# Patient Record
Sex: MALE | NOT HISPANIC OR LATINO | URBAN - METROPOLITAN AREA
[De-identification: names, ages, dates, MRNs, and addresses within clinical notes are randomized per-mention and may not be internally consistent; named-entity substitution may affect disease eponyms.]

---

## 2021-12-12 ENCOUNTER — EMERGENCY (EMERGENCY)
Facility: HOSPITAL | Age: 26
LOS: 1 days | Discharge: HOME | End: 2021-12-12
Admitting: EMERGENCY MEDICINE
Payer: COMMERCIAL

## 2021-12-12 VITALS
DIASTOLIC BLOOD PRESSURE: 76 MMHG | RESPIRATION RATE: 16 BRPM | SYSTOLIC BLOOD PRESSURE: 128 MMHG | OXYGEN SATURATION: 98 % | TEMPERATURE: 98 F | HEART RATE: 79 BPM

## 2021-12-12 VITALS
RESPIRATION RATE: 16 BRPM | SYSTOLIC BLOOD PRESSURE: 133 MMHG | HEIGHT: 72 IN | OXYGEN SATURATION: 98 % | HEART RATE: 83 BPM | TEMPERATURE: 98 F | WEIGHT: 149.91 LBS | DIASTOLIC BLOOD PRESSURE: 84 MMHG

## 2021-12-12 DIAGNOSIS — J32.9 CHRONIC SINUSITIS, UNSPECIFIED: ICD-10-CM

## 2021-12-12 DIAGNOSIS — R51.9 HEADACHE, UNSPECIFIED: ICD-10-CM

## 2021-12-12 DIAGNOSIS — R09.81 NASAL CONGESTION: ICD-10-CM

## 2021-12-12 PROCEDURE — 70450 CT HEAD/BRAIN W/O DYE: CPT | Mod: 26

## 2021-12-12 PROCEDURE — 70486 CT MAXILLOFACIAL W/O DYE: CPT | Mod: 26

## 2021-12-12 PROCEDURE — 99285 EMERGENCY DEPT VISIT HI MDM: CPT

## 2021-12-12 RX ORDER — METOCLOPRAMIDE HCL 10 MG
10 TABLET ORAL ONCE
Refills: 0 | Status: COMPLETED | OUTPATIENT
Start: 2021-12-12 | End: 2021-12-12

## 2021-12-12 RX ORDER — ACETAMINOPHEN 500 MG
650 TABLET ORAL ONCE
Refills: 0 | Status: COMPLETED | OUTPATIENT
Start: 2021-12-12 | End: 2021-12-12

## 2021-12-12 RX ADMIN — Medication 650 MILLIGRAM(S): at 13:57

## 2021-12-12 RX ADMIN — Medication 10 MILLIGRAM(S): at 13:57

## 2021-12-12 NOTE — ED PROVIDER NOTE - CLINICAL SUMMARY MEDICAL DECISION MAKING FREE TEXT BOX
25 y/o M recently sent from urgent care for CAT scan of the head. Patient says he has had some pressure along his sinuses. Will give medications and get CT head. 27 y/o M recently sent from urgent care for CAT scan of the head. Patient says he has had some pressure along his sinuses. Will get CT scans. CT scans look normal, pt will be discharged home.

## 2021-12-12 NOTE — ED PROVIDER NOTE - OBJECTIVE STATEMENT
27 y/o M recently seen at urgent care for a sinus infection and treated with amoxicillin and steroids. He was told to come to the ER for CAT scan of the head. Patient says he has had some pressure along his sinuses. He has tried Tylenol and Excedrin and it hasn't helped. No fever, chills.

## 2021-12-12 NOTE — ED PROVIDER NOTE - PATIENT PORTAL LINK FT
You can access the FollowMyHealth Patient Portal offered by [facility] by registering at the following website: [email or web address]. By joining [employer]’s FollowMyHealth portal, you will also be able to view your health information using other applications (apps) compatible with our system.

## 2021-12-12 NOTE — ED ADULT NURSE NOTE - OBJECTIVE STATEMENT
Pt came in complaining of congestion that does not seem to resolve. Pt stated that he saw PCP and was placed on abx and not helping. Pt is alert, oriented and follows command.

## 2023-10-13 NOTE — ED PROVIDER NOTE - IV ALTEPLASE EXCL ABS HIDDEN
10/12/2023  I, Haley Kurtz MD, saw and evaluated the patient. I have discussed the patient with the resident/non-physician practitioner and agree with the resident's/non-physician practitioner's findings, Plan of Care, and MDM as documented in the resident's/non-physician practitioner's note, except where noted. All available labs and Radiology studies were reviewed. I was present for key portions of any procedure(s) performed by the resident/non-physician practitioner and I was immediately available to provide assistance. At this point I agree with the current assessment done in the Emergency Department. I have conducted an independent evaluation of this patient a history and physical is as follows:    57-year-old male presents via EMS from [facility] for psychiatric evaluation. Patient currently calm and cooperative. Denying any SI or HI. Not actively hallucinating. Denies any alcohol or drug use. On exam, patient was comfortably bed in no acute distress, is normocephalic atraumatic, pupils equal round reactive to light, neck is supple without meningismus signs, heart is regular rate and rhythm with intact distal pulses, no increased work of breathing, respiratory distress, or stridor. Full range of motion of all extremities. Patient denies any SI or HI. Not actively hallucinating. No criteria for 302 as patient is at his baseline. Mother not willing to come  the patient. Complaint was filed with children and youth. We will consult case management who can assist with disposition in the a.m.     ED Course         Critical Care Time  Procedures
show
